# Patient Record
Sex: FEMALE | Race: OTHER | NOT HISPANIC OR LATINO | Employment: OTHER | URBAN - METROPOLITAN AREA
[De-identification: names, ages, dates, MRNs, and addresses within clinical notes are randomized per-mention and may not be internally consistent; named-entity substitution may affect disease eponyms.]

---

## 2017-01-03 ENCOUNTER — GENERIC CONVERSION - ENCOUNTER (OUTPATIENT)
Dept: OTHER | Facility: OTHER | Age: 82
End: 2017-01-03

## 2017-05-01 ENCOUNTER — GENERIC CONVERSION - ENCOUNTER (OUTPATIENT)
Dept: OTHER | Facility: OTHER | Age: 82
End: 2017-05-01

## 2017-06-05 ENCOUNTER — APPOINTMENT (OUTPATIENT)
Dept: PHYSICAL THERAPY | Age: 82
End: 2017-06-05
Payer: COMMERCIAL

## 2017-06-05 PROCEDURE — 97162 PT EVAL MOD COMPLEX 30 MIN: CPT

## 2017-06-12 ENCOUNTER — APPOINTMENT (OUTPATIENT)
Dept: PHYSICAL THERAPY | Age: 82
End: 2017-06-12
Payer: COMMERCIAL

## 2017-06-12 PROCEDURE — 97110 THERAPEUTIC EXERCISES: CPT

## 2017-06-15 ENCOUNTER — APPOINTMENT (OUTPATIENT)
Dept: PHYSICAL THERAPY | Age: 82
End: 2017-06-15
Payer: COMMERCIAL

## 2017-06-19 ENCOUNTER — APPOINTMENT (OUTPATIENT)
Dept: PHYSICAL THERAPY | Age: 82
End: 2017-06-19
Payer: COMMERCIAL

## 2017-06-20 ENCOUNTER — APPOINTMENT (OUTPATIENT)
Dept: PHYSICAL THERAPY | Facility: REHABILITATION | Age: 82
End: 2017-06-20
Payer: COMMERCIAL

## 2017-06-20 PROCEDURE — 97113 AQUATIC THERAPY/EXERCISES: CPT

## 2017-06-20 PROCEDURE — 97110 THERAPEUTIC EXERCISES: CPT

## 2017-06-22 ENCOUNTER — APPOINTMENT (OUTPATIENT)
Dept: PHYSICAL THERAPY | Age: 82
End: 2017-06-22
Payer: COMMERCIAL

## 2017-06-26 ENCOUNTER — APPOINTMENT (OUTPATIENT)
Dept: PHYSICAL THERAPY | Age: 82
End: 2017-06-26
Payer: COMMERCIAL

## 2017-06-27 ENCOUNTER — APPOINTMENT (OUTPATIENT)
Dept: PHYSICAL THERAPY | Facility: REHABILITATION | Age: 82
End: 2017-06-27
Payer: COMMERCIAL

## 2017-06-27 PROCEDURE — 97113 AQUATIC THERAPY/EXERCISES: CPT

## 2017-06-29 ENCOUNTER — APPOINTMENT (OUTPATIENT)
Dept: PHYSICAL THERAPY | Age: 82
End: 2017-06-29
Payer: COMMERCIAL

## 2017-06-30 ENCOUNTER — APPOINTMENT (OUTPATIENT)
Dept: PHYSICAL THERAPY | Facility: REHABILITATION | Age: 82
End: 2017-06-30
Payer: COMMERCIAL

## 2017-10-10 ENCOUNTER — GENERIC CONVERSION - ENCOUNTER (OUTPATIENT)
Dept: OTHER | Facility: OTHER | Age: 82
End: 2017-10-10

## 2018-01-11 NOTE — PROGRESS NOTES
Patient Health Assessment    Date:            01/03/2017  Blood Pressure:  145/64  Pulse:           68  Age:             87  Weight:          0 lbs  Height/Length:   0' 0"  Body Mass Index: 0 0  Provider:        DEV  Clinic:          [facility]: Diabetes    High Blood Pressure  Medications: Peridex 0 12 %    Losartan Potassium    Simvastatin Oral Tablet [Zocor]    Lorazepam    Piogritazone    Ocuvite    Fish Oil    Calcium    Magnesium    Vitamin B12    Vitamin C  Allergies:      aspirin    Codeine  Since Last Visit: Medical Alert: No Change    Medications: No Change    Allergies:        No Change  Pain Scale Type: Numeric Pain ScalePain Level: 0  Description: Patient presented for delivery of PFM on #30 Removed temp   Cemented PFM with Calibra  N V 6 months recall    MQ    ----- Signed on Tuesday, January 03, 2017 at 12:23:05 PM  -----  ----- Provider: DEV Malave DDS -- Clinic: [facility] -----

## 2018-01-11 NOTE — MISCELLANEOUS
Message  Pt was called and made aware that her urine culture did show bacteria and that the medications given to the pt will treat it and continue the treatment  Finish the medications until finished  Signatures   Electronically signed by : JOSE MANUEL Holden;  Aug 25 2016  4:30PM EST                       (Author)

## 2018-01-12 NOTE — PROGRESS NOTES
Patient Health Assessment    Date:            10/31/2016  Blood Pressure:  123/48  Pulse:           80  Age:             87  Weight:          159 lbs  Height/Length:   5' 0"  Body Mass Index: 31 0  Provider:        DEV  Clinic:          [facility] 39: Diabetes    High Blood Pressure  Medications: Peridex 0 12 %    Losartan Potassium    Simvastatin Oral Tablet [Zocor]    Lorazepam    Piogritazone    Ocuvite    Fish Oil    Calcium    Magnesium    Vitamin B12    Vitamin C  Allergies:      aspirin    Codeine  Since Last Visit: Medical Alert: No Change    Medications: No Change    Allergies:        No Change  Pain Scale Type: Numeric Pain ScalePain Level: 0  Description:  Patient presented for PFM  prep on #30  Gave 1 carp 2% lido with 1/100k epi  Prep #30 ,  PVS imp with T-tray , Fabricated temp with provisiona, Temp  cemented with temp bond   A2   MQ    ----- Signed on Monday, October 31, 2016 at 2:11:04 PM  -----  ----- Provider: DEV Menchaca DDS -- Clinic: [facility] -----

## 2018-01-12 NOTE — PROGRESS NOTES
Patient Health Assessment    Date:            10/24/2016  Blood Pressure:  139/55  Pulse:           76  Age:             87  Weight:          157 lbs  Height/Length:   5' 4"  Body Mass Index: 26 8  Provider:        [email or web address]  Clinic:          [address], ADULT [facility], 4 BWX, 1 PA ur  Medical Alert: Diabetes    High Blood Pressure  Medications: Peridex 0 12 %    Sulfamethoxazole (bactrim) for urinary infection    Losartan Potassium    Simvastatin Oral Tablet [Zocor]    Lorazepam    Piogritazone    Ocuvite    Fish Oil    Calcium    Magnesium    Vitamin B12    Vitamin C  Allergies:      aspirin    Codeine  Since Last Visit: Medical Alert: No Change    Medications: No Change    Allergies:        No Change  Pain Scale Type: Numeric Pain ScalePain Level: 0  Description: NO DENTAL PAIN  scaled, polished and cavitroned, pt difficult to scale/ very sensitive to all  scaling  a lot of calculus removed lower anteriors  dr Buffy Ritter exam= moved to room #5  nv= 6month recall    ----- Signed on Monday, October 24, 2016 at 1:51:08 PM  -----  ----- Provider: [id number] - Jose Manuel Dang [facility] -- Clinic: [place] -----

## 2018-01-16 NOTE — PROGRESS NOTES
Medical Alert: Diabetes    High Blood Pressure  Medications: Peridex 0 12 %    PreviDent 5000 Sensitive 1 1-5 %    Losartan Potassium    Simvastatin Oral Tablet [Zocor]    Lorazepam    Piogritazone    Ocuvite    Fish Oil    Calcium    Magnesium    Vitamin B12    Vitamin C    ARTIFICIAL TEARS LUBRICANT 2X15ML    BEPREVE 1 5% OPTH DROPS 5ML    CHLORHEXIDINE 0 12% RINSE 0 12 %    CLOTRIMAZOLE-BETAMETHASONE CRM 15GM    LORAZEPAM 0 5 MG TABLET    LORAZEPAM 0 5 MG TABLET    LOSARTAN 50MG TABLETS    LOSARTAN 50MG TABLETS    NITROFURANTOIN MONOHYDRATE 100 MG CAPS    ONETOUCH ULTRA TEST STRIPS    PIOGLITAZONE 15MG TABLETS    ONETOUCH ULTRA TEST STRIPS    OTHER  Allergies:      aspirin    Codeine  Since Last Visit: Medical Alert: No Change    Medications: No Change    Allergies:        No Change  Pain Scale Type: Numeric Pain ScalePain Level: 0  Description:    Patient Health Assessment    Date:            10/10/2017  Blood Pressure:  146/57  Pulse:           65  Age:             88  Weight:          159 lbs  Height/Length:   5' 0"  Body Mass Index: 31 0  Provider:        CARLOS EDUARDOP  Clinic:          [facility]      Pt presents for limited exam pain and swollen gums lower left  PMH reviewed, no   changes  Obtained PA/ around #19 crown  noticed food impaction  clean the area   with curettage  Pt advised to keep the area clean by flossing    pt also was tx   planned for a filling on 14  NV: filling #14  and cleaning    /MQ    ----- Signed on Thursday, October 12, 2017 at 12:44:43 PM  -----  ----- Provider: DEV Collins DDS -- Clinic: [facility] -----

## 2018-01-17 NOTE — PROGRESS NOTES
Patient Health Assessment    Date:            05/01/2017  Blood Pressure:  146/63  Pulse:           77  Age:             87  Weight:          159 lbs  Height/Length:   5' 0"  Body Mass Index: 31 0  Provider:        [email or web address]  Clinic:          [facility]    Recall Exam, adult prophy  Rev med hx:  pt reports being in hospital for blood in stools/ pt was put on  med and issue resolved  Medical Alert: Diabetes    High Blood Pressure  Medications: Peridex 0 12 %    Losartan Potassium    Simvastatin Oral Tablet [Zocor]    Lorazepam    Piogritazone    Ocuvite    Fish Oil    Calcium    Magnesium    Vitamin B12    Vitamin C    ARTIFICIAL TEARS LUBRICANT 2X15ML    BEPREVE 1 5% OPTH DROPS 5ML    CHLORHEXIDINE 0 12% RINSE 0 12 %    CLOTRIMAZOLE-BETAMETHASONE CRM 15GM    LORAZEPAM 0 5 MG TABLET    LOSARTAN 50MG TABLETS    NITROFURANTOIN MONOHYDRATE 100 MG CAPS    ONETOUCH ULTRA TEST STRIPS    PIOGLITAZONE 15MG TABLETS    ONETOUCH ULTRA TEST STRIPS    OTHER  Allergies:      aspirin    Codeine  Since Last Visit: Medical Alert: No Change    Medications: Change    Allergies:        No Change  Pain Scale Type: Numeric Pain ScalePain Level: 0  Description: no dental pain or concerns  scaled, polished and flossed- limited use with cavitron due to sensitivity  moderate calculus and stains removed  light bleeding  Dr Antonio Acevedo exam= no findings  nv= 6 mth recall    ----- Signed on Monday, May 01, 2017 at 12:24:32 PM  -----  ----- Provider: [id number] Olsen RDH -- Clinic: [facility] -----      ----- Appended on Monday, May 01, 2017 at 12:27:52 PM  -----  ----- Provider: GISEL Olsen RDH -- Clinic: [facility] -----  pt requests rx toothpaste / mouthwash that dr Codie Bansal rx last time   dr Antonio Acevedo  to call in rx for patient to [facility]

## 2018-01-18 NOTE — PROGRESS NOTES
Assessment    1  Urinary tract infection (599 0) (N39 0)    Plan  Urinary tract infection    · Nitrofurantoin Monohyd Macro 100 MG Oral Capsule (Macrobid); TAKE 1 CAPSULE  EVERY 12 HOURS DAILY    Discussion/Summary  Discussion Summary:   Take macrobid twice dialy for 7 days with food  Take probiotics daily  Increase fluid intake  Clal us in 2-3 days for urine culture results  Medication Side Effects Reviewed: Possible side effects of new medications were reviewed with the patient/guardian today  Understands and agrees with treatment plan: The treatment plan was reviewed with the patient/guardian  The patient/guardian understands and agrees with the treatment plan   Follow Up Instructions: Follow Up with your Primary Care Provider in 3 days  If your symptoms worsen, go to the nearest [place] [address] Emergency Department  Chief Complaint    1  Urinary Frequency  Chief Complaint Free Text Note Form: Pt has been having urinary frequency with burning sensation for the past 3 days  History of Present Illness  HPI: 79 y/o female c/o urinary burning and frequency for 3 days  Denies fevers, chills, nausea, vomiting, vaginal discharge, vaginal bleeding, flank pain  Review of Systems  Focused-Female:   Constitutional: no fever, not feeling poorly and no chills  Gastrointestinal: no abdominal pain, no nausea and no vomiting  Genitourinary: as noted in HPI, no vaginal discharge and no unexplained vaginal bleeding  ROS Reviewed:   ROS reviewed  Past Medical History  Active Problems And Past Medical History Reviewed: The active problems and past medical history were reviewed and updated today  Family History  Family History Reviewed: The family history was reviewed and updated today  Social History    · Never a smoker  Social History Reviewed: The social history was reviewed and updated today  The social history was reviewed and is unchanged        Surgical History  Surgical History Reviewed: The surgical history was reviewed and updated today  Current Meds  Medication List Reviewed: The medication list was reviewed and updated today  Allergies    1  aspirin   2  Codeine Derivatives    Vitals  Signs   Recorded: [id number] 11:44FB   Systolic: 150  Diastolic: 68  Heart Rate: 74  Respiration: 18  Temperature: 97 5 F  Height: 5 ft   Weight: 158 lb 4 oz  BMI Calculated: 30 91  BSA Calculated: 1 69    Physical Exam    Constitutional   General appearance: No acute distress, well appearing and well nourished  Pulmonary   Respiratory effort: No increased work of breathing or signs of respiratory distress  Auscultation of lungs: Clear to auscultation  Cardiovascular   Auscultation of heart: Normal rate and rhythm, normal S1 and S2, without murmurs  Abdomen   Abdomen: Non-tender, no masses  The abdomen was obese  Bowel sounds were normal  The abdomen was soft and nontender  no CVA tenderness      Signatures   Electronically signed by : IMELDA Manzanares;  Aug 20 2016  4:53PM EST                       (Author)    Electronically signed by : Sahil Tobar DO; Aug 22 2016 10:26AM EST                       (Co-author)

## 2018-01-23 ENCOUNTER — ALLSCRIPTS OFFICE VISIT (OUTPATIENT)
Dept: URGENT CARE | Age: 83
End: 2018-01-23
Payer: COMMERCIAL

## 2018-01-23 PROCEDURE — 99213 OFFICE O/P EST LOW 20 MIN: CPT | Performed by: FAMILY MEDICINE

## 2018-01-23 PROCEDURE — G0463 HOSPITAL OUTPT CLINIC VISIT: HCPCS | Performed by: FAMILY MEDICINE

## 2018-01-24 NOTE — PROGRESS NOTES
Assessment   1  Dizziness (780 4) (R42)   2  Weakness (780 79) (R53 1)   3  Nausea (787 02) (R11 0)    Discussion/Summary   Discussion Summary:    To [facility] emergency room now for further evaluation  Medication Side Effects Reviewed: Possible side effects of new medications were reviewed with the patient/guardian today  Understands and agrees with treatment plan: The treatment plan was reviewed with the patient/guardian  The patient/guardian understands and agrees with the treatment plan    Counseling Documentation With Imm: The patient, patient's family was counseled regarding instructions for management,-- prognosis,-- patient and family education,-- impressions,-- risks and benefits of treatment options,-- importance of compliance with treatment  Follow Up Instructions: Follow Up with your Primary Care Provider in days  If your symptoms worsen, go to the nearest [place] [address] Emergency Department  Chief Complaint   Chief Complaint Free Text Note Form: was feeling dizzy, tired, off and on for about 1 week  blood sugar today 120 and drinking  History of Present Illness   HPI: Patient is here for evaluation of dizziness, weakness and fatigue  She has been feeling nauseous on off for the past week  She did vomit 1 time  She is currently eating and drinking  Patient complains of a cold about 3 weeks ago and did over-the-counter treatment with full resolution  This morning she was making her bed and when she went to sit down that she got dizzy and felt nauseous  She has had episodes of nausea without the dizziness and vice versa  Hospital Based Practices Required Assessment:      Abuse And Domestic Violence Screen       Yes, the patient is safe at home  -- The patient states no one is hurting them  Depression And Suicide Screen  No, the patient has not had thoughts of hurting themself  No, the patient has not felt depressed in the past 7 days      Dizziness: Patrick Jimenez presents with complaints of dizziness  Associated symptoms include weakness-- and-- nausea, but-- no syncope,-- no diarrhea,-- no headache,-- no fever,-- no ear fullness,-- no tinnitus,-- no neck pain,-- no neck stiffness,-- no visual changes,-- no dysarthria,-- no dysphagia,-- no facial weakness-- and-- no falling episodes  Review of Systems   Focused-Female:      Constitutional: as noted in HPI       ENT: as noted in HPI  Respiratory: as noted in HPI  Gastrointestinal: as noted in HPI  Genitourinary: as noted in HPI  Musculoskeletal: as noted in HPI  Integumentary: as noted in HPI  Neurological: as noted in HPI  Past Medical History   1  History of dizziness (V13 89) (Y04 122)   2  History of hypertension (V12 59) (Z86 79)   3  History of nausea (V12 79) (Z87 898)   4  History of Non-insulin dependent type 2 diabetes mellitus (250 00) (E11 9)  Active Problems And Past Medical History Reviewed: The active problems and past medical history were reviewed and updated today  Current Meds    1  LORazepam 0 5 MG Oral Tablet; Therapy: (Recorded:23Jan2018) to Recorded   2  Losartan Potassium 25 MG Oral Tablet; Therapy: (Recorded:23Jan2018) to Recorded   3  Pantoprazole Sodium 40 MG Oral Tablet Delayed Release; Therapy: (Recorded:23Jan2018) to Recorded   4  Pioglitazone HCl - 30 MG Oral Tablet; Therapy: (Recorded:23Jan2018) to Recorded  Medication List Reviewed: The medication list was reviewed and updated today  Allergies   1   No Known Drug Allergies    Vitals   Signs   Recorded: [id number] 78:81AO   Systolic: 968, Standing  Diastolic: 66, Standing  Recorded: [id number] 01:53PM   Temperature: 98 2 F  Heart Rate: 71  Respiration: 20  Systolic: 780  Diastolic: 65  Height: 5 ft   Weight: 150 lb   BMI Calculated: 29 3  BSA Calculated: 1 65  O2 Saturation: 98  LMP: Unknown    Physical Exam        Constitutional      General appearance: No acute distress, well appearing and well nourished  Eyes      Conjunctiva and lids: No swelling, erythema or discharge  Pupils and irises: Equal, round and reactive to light  Ears, Nose, Mouth, and Throat      External inspection of ears and nose: Normal        Otoscopic examination: Tympanic membranes translucent with normal light reflex  Canals patent without erythema  Nasal mucosa, septum, and turbinates: Normal without edema or erythema  Oropharynx: Normal with no erythema, edema, exudate or lesions  Pulmonary      Respiratory effort: No increased work of breathing or signs of respiratory distress  Auscultation of lungs: Clear to auscultation  Cardiovascular      Auscultation of heart: Normal rate and rhythm, normal S1 and S2, without murmurs  -- Bilateral varicosities  No edema  Abdomen      Abdomen: Non-tender, no masses  Liver and spleen: No hepatomegaly or splenomegaly  Lymphatic      Palpation of lymph nodes in neck: No lymphadenopathy         Psychiatric      Orientation to person, place, and time: Normal        Mood and affect: Normal        Signatures    Electronically signed by : JUAN Duarte; Jan 23 2018  2:38PM EST                       (Author)     Electronically signed by : Mary King DO; Jan 23 2018  2:45PM EST                       (Co-author)